# Patient Record
Sex: FEMALE
[De-identification: names, ages, dates, MRNs, and addresses within clinical notes are randomized per-mention and may not be internally consistent; named-entity substitution may affect disease eponyms.]

---

## 2020-10-08 ENCOUNTER — TRANSCRIPTION ENCOUNTER (OUTPATIENT)
Age: 25
End: 2020-10-08

## 2022-03-02 ENCOUNTER — TRANSCRIPTION ENCOUNTER (OUTPATIENT)
Age: 27
End: 2022-03-02

## 2022-06-27 ENCOUNTER — OUTPATIENT (OUTPATIENT)
Dept: OUTPATIENT SERVICES | Facility: HOSPITAL | Age: 27
LOS: 1 days | Discharge: HOME | End: 2022-06-27

## 2022-06-27 DIAGNOSIS — Z15.89 GENETIC SUSCEPTIBILITY TO OTHER DISEASE: ICD-10-CM

## 2022-06-27 PROBLEM — Z00.00 ENCOUNTER FOR PREVENTIVE HEALTH EXAMINATION: Status: ACTIVE | Noted: 2022-06-27

## 2022-07-05 ENCOUNTER — APPOINTMENT (OUTPATIENT)
Dept: HEMATOLOGY ONCOLOGY | Facility: CLINIC | Age: 27
End: 2022-07-05

## 2022-12-09 ENCOUNTER — OUTPATIENT (OUTPATIENT)
Dept: OUTPATIENT SERVICES | Facility: HOSPITAL | Age: 27
LOS: 1 days | Discharge: HOME | End: 2022-12-09

## 2022-12-09 DIAGNOSIS — Z15.89 GENETIC SUSCEPTIBILITY TO OTHER DISEASE: ICD-10-CM

## 2022-12-13 ENCOUNTER — APPOINTMENT (OUTPATIENT)
Dept: HEMATOLOGY ONCOLOGY | Facility: CLINIC | Age: 27
End: 2022-12-13
Payer: COMMERCIAL

## 2022-12-13 PROCEDURE — 99204 OFFICE O/P NEW MOD 45 MIN: CPT | Mod: 95

## 2023-01-02 NOTE — ASSESSMENT
[FreeTextEntry1] : The visit was provided via telehealth using real-time 2-way audio visual technology. The patient, Sinai Buckley, was located at home in [place], [place] at the time of the visit. The genetic counselor, Ana M Barry, was located at the medical office in [place], [place] at the time of the visit. The physician, Dr. Vincent Bush, was located in [place], [place] at the time of the visit. The patient, Sinai Buckley, and both providers participated in the telehealth encounter. Verbal consent for telehealth services was given on 2022 by the patient, Sinai Buckley.\par \par REASON FOR CONSULT\par Sinai Buckley is a 27-year-old female self-referred for cancer genetic counseling and a discussion regarding her positive genetic testing results related to hereditary cancer predisposition. Ms. Buckley was seen on 2022, at which time medical and family history was ascertained and a pedigree constructed. \par \par RELEVANT MEDICAL HISTORY\par Ms. Buckley is a healthy individual with no reported history of cancer. She pursued genetic testing in 2022 using the Multi-Cancer panel offered at [facility] due to the MSH6 pathogenic mutation identified in her father. A pathogenic mutation was detected in the MSH6 gene (c. 1844_1848del, p. [id number]) and a variant of uncertain significance was detected in the FLCN gene (c.1637A>G, p. [id number]).\par \par Of note, the MSH6 pathogenic mutation identified in Ms. Buckley is the familial MSH6 pathogenic mutation identified in her father.\par \par OTHER MEDICAL AND SURGICAL HISTORY:\par •	Medical History: No major medical history reported\par •	Surgical History: ACL reconstruction surgery\par \par HORMONAL HISTORY:\par Obstetrical History: \par Age at Menarche: 15\par Menopausal Status: Premenopausal \par Age at First Live Birth: N/A\par Oral Contraceptive Use: Yes, 5 years in total\par Hormone Replacement Therapy: No\par \par CANCER SCREENING HISTORY:  \par Breast: \par •	Mammography: No\par •	Sonography: No\par •	MRI: No\par •	Biopsies: No\par GYN:\par •	Pelvic Examination: last 2022, reportedly normal. Patient reports she sees her gynecologist every 3-6 months due to her birth control prescription.\par •	Sonography: No\par •	CA-125: No\par Colon:\par •	Colonoscopy: No\par •	Upper Endoscopy: Yes, 2019 due to stomach pain. Patient reports findings from the endoscopy were normal.\par •	FOBT: No\par Skin:  \par •	FBSE: No\par •	Lesions biopsied/removed: No\par \par SOCIAL HISTORY:\par •	Tobacco-product use: No\par •	Environmental exposures: No\par \par FAMILY HISTORY:\par Maternal ancestry was reported as [place] and American and paternal ancestry was reported as Polish, American, and Macedonian. Ashkenazi Pentecostalism ancestry and consanguinity were denied. A detailed family history of cancer was ascertained, see below and scanned chart for pedigree. \par \par Ms. Buckley’s father was diagnosed with rectal cancer at 60 years old at which time he pursued genetic testing through [employer]. His testing revealed a pathogenic mutation in the MSH6 gene.  \par \par Of note, Ms. Buckley reports her brother, one of her paternal uncles, and her two paternal aunts tested negative for the familial MSH6 mutation. Reports were unavailable at today’s appointment. \par \par RESULTS INTERPRETATION AND ASSESSMENT:\par \par MSH6 PATHOGENIC MUTATION\par \par We informed Ms. 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 This is consistent with a diagnosis of Acevedo Syndrome (LS). LS is an autosomal-dominant inherited cancer predisposition syndrome. Ms. Buckley was informed that individuals with a pathogenic MSH6 mutation have increased risks for the following:\par \par COLORECTAL CANCER RISK:\par Lifetime risk to develop colorectal cancer is estimated to be 10-44% compared to the general population risk of 4.2%, with a mean age of onset of 42-69 years.\par \par ENDOMETRIAL CANCER RISK:\par Lifetime risk to develop endometrial cancer is estimated to be 16-49% compared to the general population risk of 3.1%, with a mean age of onset of 53-55 years.\par \par OVARIAN CANCER RISK:\par Lifetime risk to develop ovarian cancer is estimated to be up to 13% compared to the general population risk of 1.3%, with a mean age of onset of 46 years.\par \par BLADDER CANCER RISK:\par Lifetime risk for bladder cancer is estimated to be approximately 1.0-8.2% compared to the general population risk of 2.4%, with a mean age of onset of 71 years. \par \par PANCREATIC CANCER RISK:\par Lifetime risk for pancreatic cancer is estimated to be approximately 1.4-1.6% compared to the general population risk of 1.6%. \par \par OTHER CANCER RISKS:\par Other cancer risks include gastric (=1-7.9%, two cases reported at age 45 and 81), renal pelvis and/or ureter (0.7-5.5%, mean age of onset: 65-69 years), biliary tract (0.2-=1%, mean age of onset: no data), brain (0.8-1.8%, mean age of onset: 43-54 years), and small bowel (=1-4%, mean age of onset: 54 years) compared to the general population risk of <1%. Recently breast cancer was added to the LS spectrum, lifetime risk for female breast cancer is estimated to be 11.1-12.8% which is equivalent to the general population risk of 12.8%. In addition, lifetime risk for prostate cancer is estimated to be 2.5-11.6% (mean age of onset: 63 years) which is equivalent to the general population risk of 11.6%.\par \par FLCN VARIANT OF UNCERTAIN SIGNIFICANCE\par \par In addition, a variant of uncertain significance (VUS) was detected in the FLCN gene (c. 1637A>G; p. [id number]). At this time the available evidence is insufficient to determine the role of this VUS in disease and the clinical significance of this result is uncertain. Individuals with a pathogenic mutation in the FLCN gene have Itix-Arpu-Tlqé syndrome and may have an increased risk for renal cancer. It is unknown if the patient has the condition or an increased risk for the cancers associated with the FLCN gene at this time.\par \par The detection of this VUS does NOT currently change the patient’s medical management. It is NOT recommended at this time that family members use this VUS result for predictive genetic testing or medical management decisions. With more research, a VUS may be reclassified as either disease-causing or benign. The patient was encouraged to contact us annually to inquire about any new information for this variant.  \par \par IMPLICATIONS FOR THE PATIENT:\par \par Given Ms. Buckley’s personal medical history and current reported family history of cancer, and her MSH6 positive genetic test results, the following screening guidelines and risk-reducing recommendations were discussed:\par \par COLON: \par - Colonoscopies every 1-3 years are recommended starting at age 30-35 years or 2-5 years prior to the earliest colon cancer diagnosis if it is diagnosed before age 30. We emphasized interval between colonoscopies should generally not exceed 2 years. \par - We also discussed there is some data demonstrating that the use of aspirin may decrease colorectal cancer risk in individuals with LS. Optimal dose and duration of use of aspirin for colon cancer prevention in LS is still being investigated. We suggested Ms. Buckley discuss the option of taking aspirin with her gastroenterologist. \par -Ms. Buckley reported that she has established care with a gastroenterologist at this time.\par \par GYN:\par - We discussed that Ms. Buckley consider endometrial biopsy every 1-2 years starting at 30-35 years old. We encouraged Ms. Buckley to keep a menstrual calendar. We discussed the importance of prompt reporting and evaluation of any abnormal uterine bleeding or postmenopausal bleeding as endometrial cancer can often be detected early based on symptoms.\par -Given the lack of efficacious screening for ovarian cancer and unclear benefit of endometrial cancer screening via endometrial biopsy and menstraul tracking, hysterectomy along with bilateral salpingo-oophorectomy (BSO) were discussed as a reasonable risk reducing option. We encouraged Ms. Buckley to follow-up with Cancer Genetics after childbearing to review her gynecological cancer screening options.\par -We also briefly discussed chemoprevention options. Of note, Ms. Buckley has been on oral contraceptives for the last 5 years, and is continuing them at this time.\par \par UROTHELIAL:\par - There is no clear evidence to support screening for urothelial cancer in LS. Surveillance may be considered in select individuals with a family history of urothelial cancer. Surveillance options may include annual urinalysis with urine cytology starting at age 30-35.   \par \par GASTRIC AND SMALL BOWEL:\par - Upper GI surveillance with EGD is recommended every 2-4 years starting at age 30-40, preferably at time of colonoscopies.\par \par PANCREATIC:\par - Screening may be considered for individuals with a pathogenic MSH6 mutation and a family history of pancreatic cancer (first or second degree relative) beginning at age 50 (or 10 years younger than earliest diagnosis) in the setting of an experienced high-volume center, ideally under research conditions.\par - Given ’s age and current reported family history, screening is not recommended at this time. \par \par BRAIN:\par - Consider annual physical/neurologic examination starting at age 25-30.\par \par BREAST:\par - There is not evidence to support increased breast cancer screening in individuals with LS. We encouraged Ms. Buckley to continue breast cancer screening as recommended for the general population.  \par \par SKIN: \par - Consider skin exam every 1-2 years with a healthcare provider skilled in identifying LS-associated skin manifestations such as sebaceous adenocarcinomas, sebaceous adenomas, and keratoacanthomas.\par \par OTHER: \par - In the absence of other indications, Ms. Buckley should practice age-appropriate cancer screening of other organ systems as recommended for the general population.\par \par IMPLICATIONS FOR FAMILY MEMBERS:\par This mutation is inherited in an autosomal dominant pattern. We recommend the patient’s first-degree relatives, specifically her brother, pursue genetic counseling and genetic testing as there is a 50% chance, they also have the same mutation. Ms. Buckley reported that her brother tested negative for the familial MSH6 mutation. Ms. Buckley was made aware that if any at-risk relatives wanted to pursue genetic testing any time in the future, we would be happy to see them and coordinate testing. If they are not local, they can locate a genetic counselor using the National Society of Genetic Counselors, Find a Genetic Counselor Tool (www.nsgc.org/findageneticcounselor).\par \par The risk of passing on this mutation to a future generation is 50%. \par \par REPRODUCTIVE IMPLICATIONS AND OPTIONS\par The risk of passing on this mutation to a future generation is 50%. Since the genetic mutation is known, pre-implantation genetic diagnosis (PGD) is possible. PGD and prenatal diagnosis were discussed briefly. \par \par Please note, individuals with biallelic (two) mutations in the MSH6 gene have been reported to have Constitutional Mismatch Repair Deficiency (CMMRD). CMMRD is an autosomal recessive condition characterized by early-onset cancer, usually in childhood or young adult. The most common cancers are brain/CNS tumors, gastrointestinal malignancies, and hematological malignancies. For those of reproductive age, we recommend the reproductive partner of an individual who is a MSH6 carrier also have MSH6 genetic testing to assess the risk of having a child affected with CMMRD if it would inform reproductive decision making. If both individuals are carriers of MSH6 mutations, there is a up to 25% chance for each pregnancy to be affected with CMMRD.\par \par RESOURCES & SUPORT GROUPS\par Hereditary Colon Cancer Takes Guts: http://www.hcctakesguts.org/\par Colon Cancer [place]: https://www.ccalliance.org/\par Acevedo Syndrome International (www.lynchcancers.com)\par Colon Cancer [place] for Research and Education for Acevedo Syndrome (www.fightlynch.org)\par \par In addition, the oncology social workers at [facility] are available to assist with more referrals, if necessary.\par \par PLAN:\par 1. See above note for recommended management.\par 2. We encouraged sharing these results with family members as noted above. They have a risk to have inherited the same mutation. Other family may benefit from genetic testing and should contact a certified genetic counselor specializing in cancer. Due to HIPAA and [place] laws, Genetics is unable to directly contact other family at risk, but we are available should family members wish to reach out to us.\par 3. Family support resources and referrals were provided. \par 4. Genetic knowledge changes rapidly. We encouraged re-contacting Cancer Genetics every 2-3 years for any changes in screening recommendations or sooner if there are significant changes in personal or family history. We also encouraged Ms. Buckley to set up a follow-up appointment at approximately 29 years old to reassess risk-reduction strategies. \par \par For any additional questions please call Cancer Genetics at [phone]. \par \par \par Ana M Barry MS, [facility]\par Genetic Counselor, Cancer Genetics\par \par \par Attending Attestation:\par \par I have reviewed and edited the genetic counselor's note and I agree with the assessment and plan as documented. I spent approximately 45 minutes in total time of which approximately 25 minutes was face-to-face (via 2-way audiovisual telemedicine connection) with Ms. Buckley reviewing her relevant personal and family history, the genetic testing results, our risk-assessment, and options for future cancer risk-reduction for the patient and her relevant family members. Over half this time was spent in counseling and coordination of care.\par \par Vincent Bush MD\par Chief, Cancer Genetics\par [facility] [place]\par \par